# Patient Record
Sex: MALE | Race: BLACK OR AFRICAN AMERICAN | Employment: FULL TIME | URBAN - METROPOLITAN AREA
[De-identification: names, ages, dates, MRNs, and addresses within clinical notes are randomized per-mention and may not be internally consistent; named-entity substitution may affect disease eponyms.]

---

## 2021-01-01 ENCOUNTER — HOSPITAL ENCOUNTER (EMERGENCY)
Age: 46
End: 2021-03-03
Attending: EMERGENCY MEDICINE

## 2021-01-01 VITALS — SYSTOLIC BLOOD PRESSURE: 106 MMHG | DIASTOLIC BLOOD PRESSURE: 12 MMHG | OXYGEN SATURATION: 71 %

## 2021-01-01 DIAGNOSIS — I46.9 CARDIAC ARREST (HCC): Primary | ICD-10-CM

## 2021-01-01 DIAGNOSIS — I50.9 CONGESTIVE HEART FAILURE, UNSPECIFIED HF CHRONICITY, UNSPECIFIED HEART FAILURE TYPE (HCC): ICD-10-CM

## 2021-01-01 DIAGNOSIS — E11.69 TYPE 2 DIABETES MELLITUS WITH OTHER SPECIFIED COMPLICATION, UNSPECIFIED WHETHER LONG TERM INSULIN USE (HCC): ICD-10-CM

## 2021-01-01 PROCEDURE — 31500 INSERT EMERGENCY AIRWAY: CPT

## 2021-01-01 PROCEDURE — 92950 HEART/LUNG RESUSCITATION CPR: CPT

## 2021-01-01 PROCEDURE — 99285 EMERGENCY DEPT VISIT HI MDM: CPT

## 2021-03-03 NOTE — ED NOTES
[facility] contacted, request patient be held in [address] [place] [address] for eye and tissue donation.

## 2021-03-03 NOTE — PROGRESS NOTES
attended to a large family presence and coordinated conference with MD. No  home selected. Instructions were left to contact the nursing supervisor sometime today. [facility], [facility] Clinical Certified  Spiritual Care  
[phone]

## 2021-03-03 NOTE — ED NOTES
Pt arrived by [facility] in cardiac arrest, witnessed arrest by girlfriend at home, pt was drinking with girlfriend when he went into cardiac arrest. cpr initiated by [facility] upon arrival. Pt received 8 rounds of epi and 1 round of 300mg amiodarone by [facility], pt did convert to vfib at one point then back to asystole.

## 2021-03-03 NOTE — PROGRESS NOTES
responded to Death of  Renetta Tamayo, who was a 54 y.o.,male, The  provided the following Interventions: 
Provided crisis pastoral care, pastoral support and grief interventions. Offered prayers on behalf of the patient. Chart reviewed. Plan: 
Chaplains will continue to follow and will provide pastoral care on an as needed/requested basis and grief support for the family.  Naty Lujan Clinical Certified  Spiritual Care  
[phone]

## 2021-03-03 NOTE — ED PROVIDER NOTES
EMERGENCY DEPARTMENT HISTORY AND PHYSICAL EXAM 
 
2:05 AM 
 
 
Date: 3/3/2021 Patient Name: Mary Montero History of Presenting Illness Chief Complaint Patient presents with  Cardiac arrest  
 
 
 
History Provided By: EMS Additional History (Context): Mary Montero is a 54 y.o. male presents with EMS was called for man down, wife witnessed her  collapse while they were having drinks. Has a history of CHF and diabetes. They arrived to find 48-year-old gentleman in asystole, he was intubated IO line was started blood glucose was okay he was given epinephrine. His rhythm changed to V. fib but despite 300 amiodarone and defibrillation did not convert and they transported to the emergency room. History review of systems limited by patient's unresponsiveness. Emili Espinosa PCP: No primary care provider on file. Chief Complaint:  
Duration:   
Timing: Location:  
Quality:  
Severity:  
Modifying Factors:  
Associated Symptoms:  
 
 
 
 
Past History Past Medical History: No past medical history on file. Past Surgical History: No past surgical history on file. Family History: No family history on file. Social History: 
Social History Tobacco Use  Smoking status: Not on file Substance Use Topics  Alcohol use: Not on file  Drug use: Not on file Allergies: 
No Known Allergies Review of Systems Review of Systems Physical Exam  
 
 
No data found. Physical Exam 
Constitutional:   
   Appearance: He is obese. Comments: Unresponsive HENT:  
   Head: Normocephalic and atraumatic. Eyes:  
   Comments: Pupils are fixed. Conjunctiva normal  
Neck: Musculoskeletal: Normal range of motion and neck supple. Cardiovascular:  
   Comments: Pulseless. Dionicio device performing compressions. Intact femoral pulse with Dionicio compressions. Chest wall unremarkable Pulmonary: Comments: Patient is intubated. Relatively poor breath sounds. No sounds over the epigastrium. Abdominal:  
   General: There is distension (Body habitus). Genitourinary: 
   Penis: Normal.   
Musculoskeletal: Normal range of motion. Skin: 
   General: Skin is warm. Neurological:  
   Comments: Unresponsive Diagnostic Study Results Labs - No results found for this or any previous visit (from the past 12 hour(s)). Radiologic Studies - No orders to display No results found. Medications ordered:  
Medications - No data to display Medical Decision Making Initial Medical Decision Making and DDx: 
  
Verified tube placement with video-assisted laryngoscopy, visualize the tube passing between the vocal cords. Seems to be an air leak however. Balloon at the top of the tube seems appropriately inflated but we added about 5 more cc to it and the leak resolved with better breath sounds. During one of our pauses in compressions there is no spontaneous pulse, rhythm is fine V. fib and 1 defibrillation delivered at 200 J and immediately restarted chest compressions. Rhythm remained in fine V. fib. Emergency department bedside ultrasound due to body habitus very difficult to obtain good views of the heart, no cardiac activity is visualized. Given the length of effort put into this resuscitation without return of spontaneous circulation, essentially has no meaningful chance of neurologically intact recovery so efforts were discontinued. Cause of death likely CHF or cardiac disease with diabetes contributing. ED Course: Progress Notes, Reevaluation, and Consults: 
  
 
 
I am the first provider for this patient. I reviewed the vital signs, available nursing notes, past medical history, past surgical history, family history and social history. No data found. Vital Signs-Reviewed the patient's vital signs. Pulse Oximetry Analysis, Cardiac Monitor, 12 lead ekg: Interpreted by the EP. Records Reviewed: Nursing notes reviewed (Time of Review: 2:05 AM) Procedures:  
Critical Care Time:  
Aspirin: (was aspirin given for stroke?) Diagnosis Clinical Impression: 1. Cardiac arrest ([facility] 2. Type 2 diabetes mellitus with other specified complication, unspecified whether long term insulin use ([facility] 3. Congestive heart failure, unspecified HF chronicity, unspecified heart failure type ([facility] Disposition:  Follow-up Information None Patient's Medications No medications on file  
 
_______________________________ Notes:   
Sumi Roberts MD using Dragon dictation     
_______________________________